# Patient Record
Sex: FEMALE | Race: WHITE | Employment: PART TIME | URBAN - METROPOLITAN AREA
[De-identification: names, ages, dates, MRNs, and addresses within clinical notes are randomized per-mention and may not be internally consistent; named-entity substitution may affect disease eponyms.]

---

## 2020-04-09 ENCOUNTER — VIRTUAL VISIT (OUTPATIENT)
Dept: NEUROLOGY | Age: 65
End: 2020-04-09

## 2020-04-09 VITALS — HEIGHT: 64 IN | RESPIRATION RATE: 18 BRPM | WEIGHT: 117 LBS | BODY MASS INDEX: 19.97 KG/M2

## 2020-04-09 DIAGNOSIS — D32.9 MENINGIOMA (HCC): ICD-10-CM

## 2020-04-09 DIAGNOSIS — G43.809 MIGRAINE VARIANT: Primary | ICD-10-CM

## 2020-04-09 DIAGNOSIS — R47.01 APHASIA: ICD-10-CM

## 2020-04-09 RX ORDER — GUAIFENESIN 100 MG/5ML
81 LIQUID (ML) ORAL DAILY
COMMUNITY

## 2020-04-09 RX ORDER — MULTIVIT WITH MINERALS/HERBS
1 TABLET ORAL DAILY
COMMUNITY

## 2020-04-09 RX ORDER — ASCORBIC ACID 250 MG
TABLET ORAL
COMMUNITY

## 2020-04-09 NOTE — LETTER
4/9/2020 3:22 PM 
 
Patient: Kizzy Jenkins YOB: 1955 Date of Visit: 4/9/2020 Dear Yari Cochran, [address] [place] [id number] VIA Facsimile: [phone]: Thank you for referring Ms. Rahman to me for evaluation/treatment. Below are the relevant portions of my assessment and plan of care. If you have questions, please do not hesitate to call me. I look forward to following Ms. Lexi Gillespie along with you. C/o headaches x6 weeks [facility]  
NEW PATIENT EVALUATION/CONSULTATION 
  
 
PATIENT NAME: Kizzy Jenkins MRN: [id number] REASON FOR CONSULTATION: Headaches 04/09/20 Previous records (physician notes, laboratory reports, and radiology reports) and imaging studies were reviewed and summarized. My recommendations will be communicated back to the patient's physician(s) via electronic medical record and/or by [address] [facility] mail. HISTORY OF PRESENT ILLNESS: 
Kizzy Jenkins is a 71 y.o.  female presenting for evaluation of headaches, possible TIA. Patient reports a TIA 2/2006- she felt hot and had difficulty getting her words out, transient, without other associated focal deficits. She was emotional/tearful. No associated headache. She recalls having an MRI Brain previously by neurology ([facility]) 1/10/17 showing evidence of R temporal-occipital stable meningioma per patient without other acute abnormality. 8/2019 she had an episode of word finding difficulty lasting <30 minutes along with dizziness. No recall of having a severe headache with this episode. 1/2020 she was on a treadmill and experienced some visual disturbance lasting a few minutes without focal deficits or headache. She did not seek medical attention as symptom resolved quickly. On 3/10, she had similar blurred vision, difficulty making out faces.   Around that same time she experienced rather severe headache located in various locations. No associated nausea or photophobia. Duration of headache was >4 hours. No associated focal weakness, but she noted some numbness into the right hand lasting ~1 hour. No speech deficits. Two days later, she had another episode while playing tennis. She felt \"off\". She had difficulty getting her words out, no dysarthria. She had some trouble walking/possible imbalance. She felt emotional/tearful. No focal weakness or numbness. She later developed a severe headache above the right eye with photophobia. Headache lasted 4 hours. Neurological symptoms were resolved in <30 minutes. No further episodes since this time. She denies a h/o headaches/migraine. She was evaluated in the ED 3/12 [facility]) with head CT which was normal apart from pre-existing meningioma. PAST MEDICAL HISTORY: 
Past Medical History:  
Diagnosis Date  Hypercholesteremia  TIA (transient ischemic attack) PAST SURGICAL HISTORY: 
History reviewed. No pertinent surgical history. FAMILY HISTORY:  
Family History Problem Relation Age of Onset  Cancer Mother  Heart Disease Father  Stroke Father SOCIAL HISTORY: 
Social History Socioeconomic History  Marital status:  Spouse name: Not on file  Number of children: Not on file  Years of education: Not on file  Highest education level: Not on file Tobacco Use  Smoking status: Never Smoker  Smokeless tobacco: Never Used Substance and Sexual Activity  Alcohol use: Yes MEDICATIONS:  
Current Outpatient Medications Medication Sig Dispense Refill  pravastatin sodium (PRAVASTATIN PO) Take  by mouth.  therapeutic multivitamin-minerals (THERAGRAN-M) tablet Take 1 Tab by mouth daily.  b complex vitamins tablet Take 1 Tab by mouth daily.  aspirin 81 mg chewable tablet Take 81 mg by mouth daily.  ergocalciferol, vitamin D2, (VITAMIN D2 PO) Take  by mouth.  ascorbic acid, vitamin C, (Vitamin C) 250 mg tablet Take  by mouth. ALLERGIES: 
No Known Allergies REVIEW OF SYSTEMS: 
10 point ROS reviewed with patient. Please see scanned document under media. PHYSICAL EXAM: 
Vital Signs:  
Visit Vitals Ht 5' 4\" (1.626 m) Wt 53.1 kg (117 lb) BMI 20.08 kg/m² Due to this being a TeleHealth evaluation, many elements of the physical examination are unable to be assessed. Exam: 
NEUROLOGICAL EXAM: 
General: Awake, alert, speech fluent CN: EOMI, VF intact, facial strength/sensation normal and symmetric, hearing is intact Motor: AG x 4 Reflexes: deferred Coordination: No ataxia. Sensation: LT intact throughout Gait: Steady ASSESSMENT:   
  ICD-10-CM ICD-9-CM 1. Migraine variant G43.809 346.20 SED RATE (ESR) MRI BRAIN W WO CONT 2. Aphasia R47.01 784.3 SED RATE (ESR) MRI BRAIN W WO CONT 3. Meningioma (HCC) D32.9 225.2 MRI BRAIN W WO CONT  
71year old female presenting with recurrent episodes since 2/2006 of speech difficulty/aphasia, visual disturbance/blurred vision, emotional lability followed by more recent severe migrainous headaches. Prior MRI Brain performed 1/2017 ([facility]) showing R temporo-occipital stable meningioma per patient without acute abnormality. She underwent head CT as well last month without acute pathology at [facility]- will attempt to obtain these records for review. She has had 5 episodes that she can recall without residual neurologic deficits. I suspect above episodes are due to complex migraine phenomenon rather than acute ischemia given stereotypic presentation over several years followed by headaches. We will obtain updated MRI Brain to reassess her meningioma and exclude any other intracranial pathology given recent increasing events/headaches.   Also discussed obtaining inflammatory markers to exclude potential temporal arteritis given later age of headache onset. She was advised to seek immediate medical attention for any focal neurologic deficits different or unusual in presentation. PLAN: 
· ESR level · [facility] FOR [facility]GNITIVE DISORDERS · Trial of Ubrelvy 50mg PRN for migraine rescue therapy Follow-up and Dispositions · Return in about 2 months (around 6/9/2020). Gerhardt Lamy Lynnie Nam, DO Staff Neurologist 
Diplomate, [facility] Board of Psychiatry & Neurology CC Referring provider: 
 
Christiano Palma DO Sincerely, 
 
 
Landen Leos DO

## 2020-04-09 NOTE — PROGRESS NOTES
[facility]   NEW PATIENT EVALUATION/CONSULTATION       PATIENT NAME: Jillian Simeon    MRN: [id number]    REASON FOR CONSULTATION: Headaches    04/09/20    This is a telemedicine visit that was performed with the originating site at [facility] and the distant site at patient's home. Verbal consent to participate in video visit was obtained. The patient was identified by name and date of birth. This visit occurred during the Coronavirus [phone]) [facility] Emergency. I discussed with the patient the nature of our telemedicine visits, that:     I would evaluate the patient and recommend diagnostics and treatments based on my assessment   Our sessions are not being recorded and that personal health information is protected   Our team would provide follow up care in person if/when the patient needs it    Previous records (physician notes, laboratory reports, and radiology reports) and imaging studies were reviewed and summarized. My recommendations will be communicated back to the patient's physician(s) via electronic medical record and/or by [address] mail. HISTORY OF PRESENT ILLNESS:  Jillian Simeon is a 71 y.o.  female presenting for evaluation of headaches, possible TIA. Patient reports a TIA 2/2006- she felt hot and had difficulty getting her words out, transient, without other associated focal deficits. She was emotional/tearful. No associated headache. She recalls having an MRI Brain previously by neurology ([facility]) 1/10/17 showing evidence of R temporal-occipital stable meningioma per patient without other acute abnormality. 8/2019 she had an episode of word finding difficulty lasting <30 minutes along with dizziness. No recall of having a severe headache with this episode. 1/2020 she was on a treadmill and experienced some visual disturbance lasting a few minutes without focal deficits or headache.   She did not seek medical attention as symptom resolved quickly. On 3/10, she had similar blurred vision, difficulty making out faces. Around that same time she experienced rather severe headache located in various locations. No associated nausea or photophobia. Duration of headache was >4 hours. No associated focal weakness, but she noted some numbness into the right hand lasting ~1 hour. No speech deficits. Two days later, she had another episode while playing tennis. She felt \"off\". She had difficulty getting her words out, no dysarthria. She had some trouble walking/possible imbalance. She felt emotional/tearful. No focal weakness or numbness. She later developed a severe headache above the right eye with photophobia. Headache lasted 4 hours. Neurological symptoms were resolved in <30 minutes. No further episodes since this time. She denies a h/o headaches/migraine. She was evaluated in the ED 3/12 [facility]) with head CT which was normal apart from pre-existing meningioma. PAST MEDICAL HISTORY:  Past Medical History:   Diagnosis Date    Hypercholesteremia     TIA (transient ischemic attack)        PAST SURGICAL HISTORY:  History reviewed. No pertinent surgical history. FAMILY HISTORY:   Family History   Problem Relation Age of Onset    Cancer Mother     Heart Disease Father     Stroke Father          SOCIAL HISTORY:  Social History     Socioeconomic History    Marital status:      Spouse name: Not on file    Number of children: Not on file    Years of education: Not on file    Highest education level: Not on file   Tobacco Use    Smoking status: Never Smoker    Smokeless tobacco: Never Used   Substance and Sexual Activity    Alcohol use: Yes         MEDICATIONS:   Current Outpatient Medications   Medication Sig Dispense Refill    pravastatin sodium (PRAVASTATIN PO) Take  by mouth.  therapeutic multivitamin-minerals (THERAGRAN-M) tablet Take 1 Tab by mouth daily.       b complex vitamins tablet Take 1 Tab by mouth daily.      aspirin 81 mg chewable tablet Take 81 mg by mouth daily.  ergocalciferol, vitamin D2, (VITAMIN D2 PO) Take  by mouth.  ascorbic acid, vitamin C, (Vitamin C) 250 mg tablet Take  by mouth. ALLERGIES:  No Known Allergies      REVIEW OF SYSTEMS:  10 point ROS reviewed with patient. Please see scanned document under media. PHYSICAL EXAM:  Vital Signs:   Visit Vitals  Ht 5' 4\" (1.626 m)   Wt 53.1 kg (117 lb)   BMI 20.08 kg/m²     Due to this being a TeleHealth evaluation, many elements of the physical examination are unable to be assessed. Exam:  NEUROLOGICAL EXAM:  General: Awake, alert, speech fluent  CN: EOMI, VF intact, facial strength/sensation normal and symmetric, hearing is intact  Motor: AG x 4  Reflexes: deferred  Coordination: No ataxia. Sensation: LT intact throughout  Gait: Steady      ASSESSMENT:      ICD-10-CM ICD-9-CM    1. Migraine variant G43.809 346.20 SED RATE (ESR)      MRI BRAIN W WO CONT   2. Aphasia R47.01 784.3 SED RATE (ESR)      MRI BRAIN W WO CONT   3. Meningioma (HCC) D32.9 225.2 MRI BRAIN W WO CONT   71year old female presenting with recurrent episodes since 2/2006 of speech difficulty/aphasia, visual disturbance/blurred vision, emotional lability followed by more recent severe migrainous headaches. Prior MRI Brain performed 1/2017 ([facility]) showing R temporo-occipital stable meningioma per patient without acute abnormality. She underwent head CT as well last month without acute pathology at [facility]- will attempt to obtain these records for review. She has had 5 episodes that she can recall without residual neurologic deficits. I suspect above episodes are due to complex migraine phenomenon rather than acute ischemia given stereotypic presentation over several years followed by headaches. We will obtain updated MRI Brain to reassess her meningioma and exclude any other intracranial pathology given recent increasing events/headaches.   Also discussed obtaining inflammatory markers to exclude potential temporal arteritis given later age of headache onset. She was advised to seek immediate medical attention for any focal neurologic deficits different or unusual in presentation. PLAN:  · ESR level  · MRI Brain WWO  · Trial of Ubrelvy 50mg PRN for migraine rescue therapy       Follow-up and Dispositions    · Return in about 2 months (around 6/9/2020). Asha Bro DO  Staff Neurologist  Diplomate, [employer] Board of Psychiatry & Neurology       CC Referring provider:    More Costello DO

## 2020-04-24 ENCOUNTER — TELEPHONE (OUTPATIENT)
Dept: NEUROLOGY | Age: 65
End: 2020-04-24

## 2020-04-24 NOTE — TELEPHONE ENCOUNTER
----- Message from Dylan Dong sent at 4/24/2020 10:49 AM EDT -----  Regarding: Dr. Fito Altman Message/Vendor Calls    Caller's first and last name:      Reason for call: Rx \"Ubrevly\" is not covered by her insurance and is too expensive. They did recommend Rx \"Intrex\" at a cheaper price.       Callback required yes/no and why: yes      Best contact number(s): [phone]      Details to clarify the request:      Dylan Dong

## 2020-04-27 NOTE — TELEPHONE ENCOUNTER
Re: Adenike Andrews approved    Approval rec'd from [employer] via [address]. PA # [id number]  Approved 03/28/20-04/27/21    Called patient and left message to let her know that PA has been approved and I have sent approval to local [facility] via fax. * I told patient that even after approval, if cost is still too high to let us know.  Pt has medicare and would not be eligible for co-pay assist.

## 2020-04-28 ENCOUNTER — TELEPHONE (OUTPATIENT)
Dept: NEUROLOGY | Age: 65
End: 2020-04-28

## 2020-04-28 RX ORDER — BUTALBITAL, ACETAMINOPHEN AND CAFFEINE 50; 325; 40 MG/1; MG/1; MG/1
1 TABLET ORAL
Qty: 15 TAB | Refills: 0 | Status: SHIPPED | OUTPATIENT
Start: 2020-04-28

## 2020-04-28 NOTE — TELEPHONE ENCOUNTER
----- Message from Seth Sullivan sent at 4/28/2020  8:38 AM EDT -----  Regarding: Dr. Kim Mcdonnell  Pt would like a call back to see if she can get something else called in for headaches, the Joan Loza is to expensive.  Contact is [phone]

## 2020-04-28 NOTE — TELEPHONE ENCOUNTER
Spoke with patient, informed her  suggested trying Fioricet but limiting use to no more than 2 times a week. She would like to try Fioricet.

## 2020-05-11 ENCOUNTER — TELEPHONE (OUTPATIENT)
Dept: NEUROLOGY | Age: 65
End: 2020-05-11

## 2020-05-11 NOTE — TELEPHONE ENCOUNTER
Spoke with patient, she states she hasn't had a headache in 10 days, but is concerned about wanted to know if she can have it done sooner. Informed her of the precautions due to Adilene. She verbalized understanding but wanted to see if  thought she could have it done sooner.

## 2020-05-11 NOTE — TELEPHONE ENCOUNTER
----- Message from Beata Crystal sent at 5/11/2020  1:01 PM EDT -----  Regarding: DR. Arriola/Telephone  General Message/Vendor Calls    Caller's first and last name:Pt      Reason for call: Requesting MRI to be moved up due more frequent headaches.        Callback required yes/no and why:Yes      Best contact number(s):[phone]      Details to clarify the request:      Beata Crystal

## 2020-05-12 NOTE — TELEPHONE ENCOUNTER
Spoke with patient, informed her per -I am ok with her scheduling imaging now if she is willing to accept potential risk. She verbalized understanding.

## 2020-05-13 ENCOUNTER — TELEPHONE (OUTPATIENT)
Dept: NEUROLOGY | Age: 65
End: 2020-05-13

## 2020-05-13 NOTE — TELEPHONE ENCOUNTER
I have scheduled a virtual visit for next available appointment and advised Coordination of Care to move up MRI to sooner appointment, per Dr. Sandra Valle.

## 2020-05-13 NOTE — TELEPHONE ENCOUNTER
Pt calling stating she is still experiencing pretty severe headaches, not everyday but is currently having one now. Took a fioricet at 1. Has also been taking other over the control pain medication as well. Is willing to schedule another vv if needed, doesn't think the medication is helping. Please call.

## 2020-05-13 NOTE — TELEPHONE ENCOUNTER
Please schedule her for a follow up visit to discuss. Also advise that we move up her MRI if possible.  SHAKA

## 2020-05-14 ENCOUNTER — VIRTUAL VISIT (OUTPATIENT)
Dept: NEUROLOGY | Age: 65
End: 2020-05-14

## 2020-05-14 VITALS — HEIGHT: 64 IN | WEIGHT: 117 LBS | RESPIRATION RATE: 18 BRPM | BODY MASS INDEX: 19.97 KG/M2

## 2020-05-14 DIAGNOSIS — G43.809 MIGRAINE VARIANT: Primary | ICD-10-CM

## 2020-05-14 DIAGNOSIS — D32.9 MENINGIOMA (HCC): ICD-10-CM

## 2020-05-14 DIAGNOSIS — R47.01 APHASIA: ICD-10-CM

## 2020-05-14 RX ORDER — NORTRIPTYLINE HYDROCHLORIDE 25 MG/1
25 CAPSULE ORAL
Qty: 30 CAP | Refills: 2 | Status: SHIPPED | OUTPATIENT
Start: 2020-05-14 | End: 2020-06-11 | Stop reason: DRUGHIGH

## 2020-05-14 RX ORDER — ACETAMINOPHEN 325 MG/1
TABLET ORAL
COMMUNITY

## 2020-05-14 NOTE — PATIENT INSTRUCTIONS
PRESCRIPTION REFILL POLICY [facility] Statement to Patients April 1, 2014 In an effort to ensure the large volume of patient prescription refills is processed in the most efficient and expeditious manner, we are asking our patients to assist us by calling your Pharmacy for all prescription refills, this will include also your  Mail Order Pharmacy. The pharmacy will contact our office electronically to continue the refill process. Please do not wait until the last minute to call your pharmacy. We need at least 48 hours (2days) to fill prescriptions. We also encourage you to call your pharmacy before going to  your prescription to make sure it is ready. With regard to controlled substance prescription refill requests (narcotic refills) that need to be picked up at our office, we ask your cooperation by providing us with at least 72 hours (3days) notice that you will need a refill. We will not refill narcotic prescription refill requests after 4:00pm on any weekday, Monday through Thursday, or after 2:00pm on Fridays, or on the weekends. We encourage everyone to explore another way of getting your prescription refill request processed using [employer], our patient web portal through our electronic medical record system. [employer] is an efficient and effective way to communicate your medication request directly to the office and  downloadable as an demetrio on your smart phone . [employer] also features a review functionality that allows you to view your medication list as well as leave messages for your physician. Are you ready to get connected? If so please review the attatched instructions or speak to any of our staff to get you set up right away! Thank you so much for your cooperation. Should you have any questions please contact our Practice Administrator. The Physicians and Staff,  [facility]

## 2020-05-14 NOTE — PROGRESS NOTES
Neurology Clinic Follow up Note    Patient ID:  Lisa Sandoval  [id number]  71 y.o.  1955      Ms. Martine Toscano is here for follow up today of headaches. This is a telemedicine visit that was performed with the originating site at [facility] and the distant site at patient's home. Verbal consent to participate in video visit was obtained. The patient was identified by name and date of birth. This visit occurred during the Coronavirus [phone]) [facility]. I discussed with the patient the nature of our telemedicine visits, that:     I would evaluate the patient and recommend diagnostics and treatments based on my assessment   Our sessions are not being recorded and that personal health information is protected   Our team would provide follow up care in person if/when the patient needs it    Last Appointment With Me:  4/9/2020     \"Noreen Toscano is a 71 y.o.  female presenting for evaluation of headaches, possible TIA. Patient reports a TIA 2/2006- she felt hot and had difficulty getting her words out, transient, without other associated focal deficits. She was emotional/tearful. No associated headache. She recalls having an MRI Brain previously by neurology ([facility]) 1/10/17 showing evidence of R temporal-occipital stable meningioma per patient without other acute abnormality. 8/2019 she had an episode of word finding difficulty lasting <30 minutes along with dizziness. No recall of having a severe headache with this episode. 1/2020 she was on a treadmill and experienced some visual disturbance lasting a few minutes without focal deficits or headache. She did not seek medical attention as symptom resolved quickly. On 3/10, she had similar blurred vision, difficulty making out faces. Around that same time she experienced rather severe headache located in various locations. No associated nausea or photophobia. Duration of headache was >4 hours.   No associated focal weakness, but she noted some numbness into the right hand lasting ~1 hour. No speech deficits. Two days later, she had another episode while playing tennis. She felt \"off\". She had difficulty getting her words out, no dysarthria. She had some trouble walking/possible imbalance. She felt emotional/tearful. No focal weakness or numbness. She later developed a severe headache above the right eye with photophobia. Headache lasted 4 hours. Neurological symptoms were resolved in <30 minutes. No further episodes since this time. She denies a h/o headaches/migraine. She was evaluated in the ED 3/12 [facility]) with head CT which was normal apart from pre-existing meningioma. \"      Interval History:   Patient reports she had a bad headaches 2 days ago not responding to current rescue medication. She reports 4 headaches over the past month with +photophobia lasting approximately 4-6 hours. No associated word finding difficulty or focal deficits with recent events. She is using Fioricet for rescue therapy- this appears minimally helpful for her headaches. PMHx/ PSHx/ FHx/ SHx:  Reviewed and unchanged previous visit. Past Medical History:   Diagnosis Date    Hypercholesteremia     TIA (transient ischemic attack)          ROS:  Comprehensive review of systems negative except for as noted above. Objective:       Meds:  Current Outpatient Medications   Medication Sig Dispense Refill    ibuprofen (AdviL) 100 mg tablet Take 100 mg by mouth every six (6) hours as needed for Pain.  acetaminophen (TylenoL) 325 mg tablet Take  by mouth every four (4) hours as needed for Pain.  butalbital-acetaminophen-caffeine (FIORICET, ESGIC) -40 mg per tablet Take 1 Tab by mouth every six (6) hours as needed for Headache (May take 1 tablet at onset of migraine. Limit use to no more than twice weekly for migraines. ). 15 Tab 0    pravastatin sodium (PRAVASTATIN PO) Take  by mouth.       therapeutic multivitamin-minerals (THERAGRAN-M) tablet Take 1 Tab by mouth daily.  b complex vitamins tablet Take 1 Tab by mouth daily.  aspirin 81 mg chewable tablet Take 81 mg by mouth daily.  ergocalciferol, vitamin D2, (VITAMIN D2 PO) Take  by mouth.  ascorbic acid, vitamin C, (Vitamin C) 250 mg tablet Take  by mouth. Exam:  Visit Vitals  Resp 18   Ht 5' 4\" (1.626 m)   Wt 53.1 kg (117 lb)   BMI 20.08 kg/m²     Due to this being a TeleHealth evaluation, many elements of the physical examination are unable to be assessed. Exam:  NEUROLOGICAL EXAM:  General: Awake, alert, speech fluent  CN: EOMI, VF intact, facial strength/sensation normal and symmetric, hearing is intact  Motor: AG x 4  Reflexes: deferred  Coordination: No ataxia. Sensation: LT intact throughout  Gait: Steady      LABS  No results found for this or any previous visit. IMAGING:  MRI Results (most recent):  No results found for this or any previous visit. Assessment:     Encounter Diagnoses     ICD-10-CM ICD-9-CM   1. Migraine variant G43.809 346.20   2. Aphasia R47.01 784.3   3. Meningioma [facility]) D32.9 25.2   71year old female seen for recurrent episodes since 2/2006 of speech difficulty/aphasia, visual disturbance/blurred vision, emotional lability followed by more recent severe migrainous headaches. Prior MRI Brain performed 1/2017 ([facility]) showing R temporo-occipital stable meningioma per patient without acute abnormality. She underwent head CT as well without acute pathology at [facility] more recently- will attempt to obtain these records for review. She has had 5 episodes that she can recall without residual neurologic deficits. I suspect above episodes are due to complex migraine phenomenon rather than acute ischemia given stereotypic presentation over several years followed by headaches.   We will obtain updated MRI Brain to reassess her meningioma and exclude any other intracranial pathology given recent increasing events/headaches. Also discussed obtaining inflammatory markers to exclude potential temporal arteritis given later age of headache onset. She was advised to seek immediate medical attention for any focal neurologic deficits different or unusual in presentation. Given persistent headaches, we reviewed options for migraine prevention today including TCA therapy and potential side effects such as sedation/fatigue, dry eyes/mouth, constipation, cardiac conduction defects. She elects to proceed. Plan:   ESR level pending  MRI brain pending to reassess meningioma and exclude acute intracranial pathology  Start Nortriptyline 25mg qhs for migraine prophylaxis    Follow-up and Dispositions    · Return in about 4 weeks (around 6/11/2020).          Signed:  Hari Perkins DO  5/14/2020

## 2020-06-01 ENCOUNTER — TELEPHONE (OUTPATIENT)
Dept: NEUROLOGY | Age: 65
End: 2020-06-01

## 2020-06-03 ENCOUNTER — OFFICE VISIT (OUTPATIENT)
Dept: PRIMARY CARE CLINIC | Age: 65
End: 2020-06-03

## 2020-06-03 DIAGNOSIS — Z20.822 EXPOSURE TO COVID-19 VIRUS: Primary | ICD-10-CM

## 2020-06-03 NOTE — PROGRESS NOTES
Patient was seen at [facility] thr flu clinic. Please seen scanned form for additional visit documentation. Patient consented to treatment. Asymptomatic. No known exposure but worried. Appears well. COVID checked.

## 2020-06-04 ENCOUNTER — TELEPHONE (OUTPATIENT)
Dept: INTERNAL MEDICINE CLINIC | Age: 65
End: 2020-06-04

## 2020-06-04 LAB — SARS-COV-2, NAA: NOT DETECTED

## 2020-06-10 ENCOUNTER — HOSPITAL ENCOUNTER (OUTPATIENT)
Dept: MRI IMAGING | Age: 65
Discharge: HOME | End: 2020-06-10
Attending: PSYCHIATRY & NEUROLOGY
Payer: MEDICARE

## 2020-06-10 DIAGNOSIS — D32.9 MENINGIOMA (HCC): ICD-10-CM

## 2020-06-10 DIAGNOSIS — G43.809 MIGRAINE VARIANT: ICD-10-CM

## 2020-06-10 DIAGNOSIS — R47.01 APHASIA: ICD-10-CM

## 2020-06-10 PROCEDURE — A9575 INJ GADOTERATE MEGLUMI 0.1ML: HCPCS | Performed by: PSYCHIATRY & NEUROLOGY

## 2020-06-10 PROCEDURE — 74011250636 HC RX REV CODE- 250/636: Performed by: PSYCHIATRY & NEUROLOGY

## 2020-06-10 PROCEDURE — 70553 MRI BRAIN STEM W/O & W/DYE: CPT

## 2020-06-10 RX ORDER — GADOTERATE MEGLUMINE 376.9 MG/ML
10 INJECTION INTRAVENOUS
Status: COMPLETED | OUTPATIENT
Start: 2020-06-10 | End: 2020-06-10

## 2020-06-10 RX ADMIN — GADOTERATE MEGLUMINE 10 ML: 376.9 INJECTION INTRAVENOUS at 21:01

## 2020-06-11 RX ORDER — NORTRIPTYLINE HYDROCHLORIDE 10 MG/1
10 CAPSULE ORAL
Qty: 90 CAP | Refills: 0 | Status: SHIPPED | OUTPATIENT
Start: 2020-06-11

## 2025-08-01 ENCOUNTER — HOSPITAL ENCOUNTER (OUTPATIENT)
Age: 70
Discharge: HOME | End: 2025-08-01
Payer: MEDICARE

## 2025-08-01 ENCOUNTER — TRANSCRIBE ORDERS (OUTPATIENT)
Facility: HOSPITAL | Age: 70
End: 2025-08-01

## 2025-08-01 DIAGNOSIS — M54.16 LUMBAR RADICULOPATHY: ICD-10-CM

## 2025-08-01 DIAGNOSIS — M54.16 LUMBAR RADICULOPATHY: Primary | ICD-10-CM

## 2025-08-01 PROCEDURE — 72148 MRI LUMBAR SPINE W/O DYE: CPT

## 2025-08-22 ENCOUNTER — OFFICE VISIT (OUTPATIENT)
Age: 70
End: 2025-08-22
Payer: MEDICARE

## 2025-08-22 VITALS
BODY MASS INDEX: 21.85 KG/M2 | SYSTOLIC BLOOD PRESSURE: 136 MMHG | OXYGEN SATURATION: 98 % | HEART RATE: 82 BPM | DIASTOLIC BLOOD PRESSURE: 86 MMHG | WEIGHT: 128 LBS | HEIGHT: 64 IN

## 2025-08-22 DIAGNOSIS — R90.82 WHITE MATTER ABNORMALITY ON MRI OF BRAIN: Primary | ICD-10-CM

## 2025-08-22 DIAGNOSIS — E78.5 DYSLIPIDEMIA: ICD-10-CM

## 2025-08-22 DIAGNOSIS — Z86.73 OLD CEREBROVASCULAR ACCIDENT (CVA) WITHOUT LATE EFFECT: ICD-10-CM

## 2025-08-22 PROCEDURE — 1036F TOBACCO NON-USER: CPT | Performed by: PSYCHIATRY & NEUROLOGY

## 2025-08-22 PROCEDURE — 99205 OFFICE O/P NEW HI 60 MIN: CPT | Performed by: PSYCHIATRY & NEUROLOGY

## 2025-08-22 PROCEDURE — G8420 CALC BMI NORM PARAMETERS: HCPCS | Performed by: PSYCHIATRY & NEUROLOGY

## 2025-08-22 PROCEDURE — 1123F ACP DISCUSS/DSCN MKR DOCD: CPT | Performed by: PSYCHIATRY & NEUROLOGY

## 2025-08-22 PROCEDURE — G8400 PT W/DXA NO RESULTS DOC: HCPCS | Performed by: PSYCHIATRY & NEUROLOGY

## 2025-08-22 PROCEDURE — 3017F COLORECTAL CA SCREEN DOC REV: CPT | Performed by: PSYCHIATRY & NEUROLOGY

## 2025-08-22 PROCEDURE — G8427 DOCREV CUR MEDS BY ELIG CLIN: HCPCS | Performed by: PSYCHIATRY & NEUROLOGY

## 2025-08-22 PROCEDURE — 1159F MED LIST DOCD IN RCRD: CPT | Performed by: PSYCHIATRY & NEUROLOGY

## 2025-08-22 PROCEDURE — 1090F PRES/ABSN URINE INCON ASSESS: CPT | Performed by: PSYCHIATRY & NEUROLOGY

## 2025-08-22 RX ORDER — ASPIRIN 81 MG/1
TABLET, CHEWABLE ORAL
COMMUNITY

## 2025-08-22 RX ORDER — B-COMPLEX WITH VITAMIN C
1 TABLET ORAL DAILY
COMMUNITY

## 2025-08-22 RX ORDER — PREGABALIN 75 MG/1
CAPSULE ORAL
COMMUNITY
Start: 2025-07-11

## 2025-08-22 RX ORDER — IBUPROFEN 200 MG
TABLET ORAL EVERY 6 HOURS PRN
COMMUNITY

## 2025-08-22 RX ORDER — PRAVASTATIN SODIUM 20 MG
TABLET ORAL
COMMUNITY
End: 2025-08-22

## 2025-08-22 RX ORDER — CHLORHEXIDINE GLUCONATE ORAL RINSE 1.2 MG/ML
SOLUTION DENTAL
COMMUNITY
Start: 2025-08-08

## 2025-08-22 RX ORDER — NORTRIPTYLINE HYDROCHLORIDE 25 MG/1
CAPSULE ORAL
COMMUNITY
End: 2025-08-22

## 2025-08-22 RX ORDER — ATORVASTATIN CALCIUM 40 MG/1
40 TABLET, FILM COATED ORAL DAILY
Qty: 90 TABLET | Refills: 0 | Status: SHIPPED | OUTPATIENT
Start: 2025-08-22

## 2025-08-22 RX ORDER — LOSARTAN POTASSIUM 25 MG/1
25 TABLET ORAL DAILY
COMMUNITY
Start: 2025-03-18

## 2025-08-22 RX ORDER — B-COMPLEX WITH VITAMIN C
TABLET ORAL
COMMUNITY

## 2025-08-22 RX ORDER — DICLOFENAC POTASSIUM 50 MG/1
TABLET, FILM COATED ORAL
COMMUNITY

## 2025-08-22 RX ORDER — ATORVASTATIN CALCIUM 20 MG/1
1 TABLET, FILM COATED ORAL DAILY
COMMUNITY
End: 2025-08-22

## 2025-08-22 RX ORDER — CHLORAL HYDRATE 500 MG
1000 CAPSULE ORAL DAILY
COMMUNITY

## 2025-08-22 RX ORDER — GABAPENTIN 300 MG/1
300 CAPSULE ORAL NIGHTLY
COMMUNITY
Start: 2025-01-23

## 2025-08-22 ASSESSMENT — PATIENT HEALTH QUESTIONNAIRE - PHQ9
SUM OF ALL RESPONSES TO PHQ QUESTIONS 1-9: 2
1. LITTLE INTEREST OR PLEASURE IN DOING THINGS: SEVERAL DAYS
2. FEELING DOWN, DEPRESSED OR HOPELESS: SEVERAL DAYS
SUM OF ALL RESPONSES TO PHQ QUESTIONS 1-9: 2